# Patient Record
Sex: MALE | NOT HISPANIC OR LATINO | URBAN - METROPOLITAN AREA
[De-identification: names, ages, dates, MRNs, and addresses within clinical notes are randomized per-mention and may not be internally consistent; named-entity substitution may affect disease eponyms.]

---

## 2021-05-23 ENCOUNTER — EMERGENCY (EMERGENCY)
Facility: HOSPITAL | Age: 31
LOS: 1 days | Discharge: HOME | End: 2021-05-23
Attending: EMERGENCY MEDICINE | Admitting: EMERGENCY MEDICINE
Payer: MEDICAID

## 2021-05-23 VITALS
DIASTOLIC BLOOD PRESSURE: 72 MMHG | RESPIRATION RATE: 19 BRPM | HEART RATE: 87 BPM | OXYGEN SATURATION: 100 % | SYSTOLIC BLOOD PRESSURE: 124 MMHG

## 2021-05-23 VITALS
TEMPERATURE: 98 F | RESPIRATION RATE: 17 BRPM | DIASTOLIC BLOOD PRESSURE: 80 MMHG | HEART RATE: 147 BPM | OXYGEN SATURATION: 98 % | SYSTOLIC BLOOD PRESSURE: 147 MMHG

## 2021-05-23 LAB
ALBUMIN SERPL ELPH-MCNC: 4.4 G/DL — SIGNIFICANT CHANGE UP (ref 3.3–5)
ALP SERPL-CCNC: 77 U/L — SIGNIFICANT CHANGE UP (ref 40–120)
ALT FLD-CCNC: 30 U/L — SIGNIFICANT CHANGE UP (ref 4–41)
ANION GAP SERPL CALC-SCNC: 11 MMOL/L — SIGNIFICANT CHANGE UP (ref 7–14)
AST SERPL-CCNC: 20 U/L — SIGNIFICANT CHANGE UP (ref 4–40)
BASOPHILS # BLD AUTO: 0.05 K/UL — SIGNIFICANT CHANGE UP (ref 0–0.2)
BASOPHILS NFR BLD AUTO: 0.5 % — SIGNIFICANT CHANGE UP (ref 0–2)
BILIRUB SERPL-MCNC: 0.8 MG/DL — SIGNIFICANT CHANGE UP (ref 0.2–1.2)
BUN SERPL-MCNC: 9 MG/DL — SIGNIFICANT CHANGE UP (ref 7–23)
CALCIUM SERPL-MCNC: 9.5 MG/DL — SIGNIFICANT CHANGE UP (ref 8.4–10.5)
CHLORIDE SERPL-SCNC: 103 MMOL/L — SIGNIFICANT CHANGE UP (ref 98–107)
CO2 SERPL-SCNC: 24 MMOL/L — SIGNIFICANT CHANGE UP (ref 22–31)
CREAT SERPL-MCNC: 0.94 MG/DL — SIGNIFICANT CHANGE UP (ref 0.5–1.3)
CRP SERPL-MCNC: <4 MG/L — SIGNIFICANT CHANGE UP
D DIMER BLD IA.RAPID-MCNC: 253 NG/ML DDU — HIGH
EOSINOPHIL # BLD AUTO: 0.19 K/UL — SIGNIFICANT CHANGE UP (ref 0–0.5)
EOSINOPHIL NFR BLD AUTO: 2 % — SIGNIFICANT CHANGE UP (ref 0–6)
ERYTHROCYTE [SEDIMENTATION RATE] IN BLOOD: 2 MM/HR — SIGNIFICANT CHANGE UP (ref 1–15)
GLUCOSE SERPL-MCNC: 99 MG/DL — SIGNIFICANT CHANGE UP (ref 70–99)
HCT VFR BLD CALC: 48.3 % — SIGNIFICANT CHANGE UP (ref 39–50)
HGB BLD-MCNC: 16.3 G/DL — SIGNIFICANT CHANGE UP (ref 13–17)
IANC: 5.72 K/UL — SIGNIFICANT CHANGE UP (ref 1.5–8.5)
IMM GRANULOCYTES NFR BLD AUTO: 0.3 % — SIGNIFICANT CHANGE UP (ref 0–1.5)
LYMPHOCYTES # BLD AUTO: 2.73 K/UL — SIGNIFICANT CHANGE UP (ref 1–3.3)
LYMPHOCYTES # BLD AUTO: 28.8 % — SIGNIFICANT CHANGE UP (ref 13–44)
MAGNESIUM SERPL-MCNC: 2.4 MG/DL — SIGNIFICANT CHANGE UP (ref 1.6–2.6)
MCHC RBC-ENTMCNC: 29.4 PG — SIGNIFICANT CHANGE UP (ref 27–34)
MCHC RBC-ENTMCNC: 33.7 GM/DL — SIGNIFICANT CHANGE UP (ref 32–36)
MCV RBC AUTO: 87.2 FL — SIGNIFICANT CHANGE UP (ref 80–100)
MONOCYTES # BLD AUTO: 0.75 K/UL — SIGNIFICANT CHANGE UP (ref 0–0.9)
MONOCYTES NFR BLD AUTO: 7.9 % — SIGNIFICANT CHANGE UP (ref 2–14)
NEUTROPHILS # BLD AUTO: 5.72 K/UL — SIGNIFICANT CHANGE UP (ref 1.8–7.4)
NEUTROPHILS NFR BLD AUTO: 60.5 % — SIGNIFICANT CHANGE UP (ref 43–77)
NRBC # BLD: 0 /100 WBCS — SIGNIFICANT CHANGE UP
NRBC # FLD: 0 K/UL — SIGNIFICANT CHANGE UP
PLATELET # BLD AUTO: 375 K/UL — SIGNIFICANT CHANGE UP (ref 150–400)
POTASSIUM SERPL-MCNC: 4 MMOL/L — SIGNIFICANT CHANGE UP (ref 3.5–5.3)
POTASSIUM SERPL-SCNC: 4 MMOL/L — SIGNIFICANT CHANGE UP (ref 3.5–5.3)
PROT SERPL-MCNC: 7.9 G/DL — SIGNIFICANT CHANGE UP (ref 6–8.3)
RBC # BLD: 5.54 M/UL — SIGNIFICANT CHANGE UP (ref 4.2–5.8)
RBC # FLD: 12.1 % — SIGNIFICANT CHANGE UP (ref 10.3–14.5)
SODIUM SERPL-SCNC: 138 MMOL/L — SIGNIFICANT CHANGE UP (ref 135–145)
TROPONIN T, HIGH SENSITIVITY RESULT: <6 NG/L — SIGNIFICANT CHANGE UP
TSH SERPL-MCNC: 1.51 UIU/ML — SIGNIFICANT CHANGE UP (ref 0.27–4.2)
WBC # BLD: 9.47 K/UL — SIGNIFICANT CHANGE UP (ref 3.8–10.5)
WBC # FLD AUTO: 9.47 K/UL — SIGNIFICANT CHANGE UP (ref 3.8–10.5)

## 2021-05-23 PROCEDURE — 71045 X-RAY EXAM CHEST 1 VIEW: CPT | Mod: 26

## 2021-05-23 PROCEDURE — 93010 ELECTROCARDIOGRAM REPORT: CPT

## 2021-05-23 PROCEDURE — 71275 CT ANGIOGRAPHY CHEST: CPT | Mod: 26

## 2021-05-23 PROCEDURE — 99284 EMERGENCY DEPT VISIT MOD MDM: CPT | Mod: 25

## 2021-05-23 RX ORDER — SODIUM CHLORIDE 9 MG/ML
1000 INJECTION, SOLUTION INTRAVENOUS ONCE
Refills: 0 | Status: COMPLETED | OUTPATIENT
Start: 2021-05-23 | End: 2021-05-23

## 2021-05-23 RX ADMIN — SODIUM CHLORIDE 1000 MILLILITER(S): 9 INJECTION, SOLUTION INTRAVENOUS at 16:23

## 2021-05-23 NOTE — ED PROVIDER NOTE - PHYSICAL EXAMINATION
GENERAL: Patient awake alert NAD.  HEENT: NC/AT, Moist mucous membranes, PERRL, EOMI.  LUNGS: CTAB, no wheezes or crackles.  CARDIAC: sinus tachycardia, no m/r/g.  ABDOMEN: Soft, NT, ND, No rebound, guarding.  EXT: No edema. No calf tenderness. CV 2+DP/PT bilaterally.   MSK: No pain with movement, no deformities.  NEURO: A&Ox3. Moving all extremities. Motor strength 5/5 in all four extremities. Sensation intact. CN 2-12 grossly intact.  SKIN: Warm and dry. No rash.  PSYCH: Normal affect.

## 2021-05-23 NOTE — ED PROVIDER NOTE - NSFOLLOWUPINSTRUCTIONS_ED_ALL_ED_FT
You were seen for palpitations.    Your work-up in the ED did not reveal anything acutely concerning.    I have set you up with a cardiology follow-up.  You should get a call from the hospital within three days with a confirmed appointment.  If you do not, you can call the number above to make an appointment.    If you have any concerning symptoms, seek immediate medical attention.

## 2021-05-23 NOTE — ED ADULT NURSE NOTE - NSIMPLEMENTINTERV_GEN_ALL_ED
Implemented All Fall Risk Interventions:  [place] to call system. Call bell, personal items and telephone within reach. Instruct patient to call for assistance. Room bathroom lighting operational. Non-slip footwear when patient is off stretcher. Physically safe environment: no spills, clutter or unnecessary equipment. Stretcher in lowest position, wheels locked, appropriate side rails in place. Provide visual cue, wrist band, yellow gown, etc. Monitor gait and stability. Monitor for mental status changes and reorient to person, place, and time. Review medications for side effects contributing to fall risk. Reinforce activity limits and safety measures with patient and family.

## 2021-05-23 NOTE — ED ADULT NURSE NOTE - OBJECTIVE STATEMENT
29 y/o male arrives to E.D. rm 21 c/o fast heart rate and nausea x 10 days. Pt seen at another E.D. twice recently for same sxs. Pt is a&ox4, ambulatory, neg sob, pt speaking in complete sentences, denies chest pain, endorses palpitations, also c/o persistent nausea and poor PO intake, denies vomiting/diarrhea, c/o subjective fever last night, denies difficulty urinating. NSR/ST to 120s on tele currently. Will continue to monitor.

## 2021-05-23 NOTE — ED ADULT TRIAGE NOTE - CHIEF COMPLAINT QUOTE
Pt c/o intermittent palpitations and chest pain x10 days. Pt also endorses sob and dizziness. Pt breathing even and unlabored in triage. Pt tachycardic in triage.

## 2021-05-23 NOTE — ED PROVIDER NOTE - ATTENDING CONTRIBUTION TO CARE
I performed a face-to-face evaluation of the patient and performed a history and physical examination. I agree with the history and physical examination.    Yonas: Got COVID vaccine. P/w palpitations ([facility] subjective F, sweating. HR up to 110, no dysrhythmia. General weakness. Check TSH, Hb, trop, ESR/CRP (? pericarditis or endocarditis). Give IVF. Not likely PE or other VTE: PERC or Wells low score, EKG no e/o R-heart strain. I performed a face-to-face evaluation of the patient and performed a history and physical examination. I agree with the history and physical examination.    Yonas: Got COVID vaccine 9 days ago. Since then, palpitations (seen at [place] ED x 2 for this), subjective F, sweating. HR up to 110, no dysrhythmia. General weakness. Check TSH, Hb, trop, ESR/CRP (? pericarditis or endocarditis). Give IVF. Walking , SaO2 90%. Check d-dimer for PE.

## 2021-05-23 NOTE — ED PROVIDER NOTE - CLINICAL SUMMARY MEDICAL DECISION MAKING FREE TEXT BOX
Yonas: Got COVID vaccine. P/w palpitations (seen at [place] ED x 2 for this), subjective F, sweating. HR up to 110, no dysrhythmia. General weakness. Check TSH, Hb, trop, ESR/CRP (? pericarditis or endocarditis). Give IVF. Not likely PE or other VTE: PERC or Wells low score, EKG no e/o R-heart strain. Yonas: Got COVID vaccine 9 days ago. Since then, palpitations (seen at [place] ED x 2 for this), subjective F, sweating. HR up to 110, no dysrhythmia. General weakness. Check TSH, Hb, trop, ESR/CRP (? pericarditis or endocarditis). Give IVF. Walking , SaO2 90%. Check d-dimer for PE. Yonas: Got COVID vaccine 9 days ago. Since then, palpitations (seen at [place] ED x 2 for this), subjective F, sweating. HR up to 110, no dysrhythmia. General weakness. Check TSH, Hb, trop, ESR/CRP (? pericarditis or endocarditis). Give IVF. Walking , SaO2 90%. Check d-dimer and CTA for PE.

## 2021-05-23 NOTE — ED PROVIDER NOTE - PROGRESS NOTE DETAILS
Abdelrahman PGY2 - HR improved to the 80s.  W/u unremarkable.  Will dc w/ urgent cards f/u.  All other questions and concerns have been addressed.  Pt. agreeable to plan.

## 2021-05-23 NOTE — ED PROVIDER NOTE - OBJECTIVE STATEMENT
30M w/ no PMHx p/w palpitations since 5/16.  Pt. got his first dose of moderna on 5/14.  Endorses chest discomfort, SOB, weakness.  Subjective fevers last night.  Denies any cough, urinary sxs, rectal bleeding.  Went to two EDs over the last week and was dc/d after a negative w/u.  Never told to f/u w/ a cardiologist.  Endorses good PO intake.  Former smoker (3-4 cigarettes per month), current occasional marijuana smoking.  Denies recent long-distance travel or LE swelling.  Denies hx of MI, CVA, cardiac stents.  Denies FHx of CAD.

## 2021-05-26 PROBLEM — Z78.9 OTHER SPECIFIED HEALTH STATUS: Chronic | Status: ACTIVE | Noted: 2021-05-23

## 2021-05-26 PROBLEM — Z00.00 ENCOUNTER FOR PREVENTIVE HEALTH EXAMINATION: Status: ACTIVE | Noted: 2021-05-26

## 2021-05-27 ENCOUNTER — APPOINTMENT (OUTPATIENT)
Dept: CARDIOLOGY | Facility: CLINIC | Age: 31
End: 2021-05-27

## 2021-06-02 ENCOUNTER — APPOINTMENT (OUTPATIENT)
Dept: CARDIOLOGY | Facility: CLINIC | Age: 31
End: 2021-06-02
Payer: MEDICAID

## 2021-06-02 ENCOUNTER — NON-APPOINTMENT (OUTPATIENT)
Age: 31
End: 2021-06-02

## 2021-06-02 VITALS
HEART RATE: 99 BPM | BODY MASS INDEX: 25.16 KG/M2 | HEIGHT: 68 IN | SYSTOLIC BLOOD PRESSURE: 116 MMHG | DIASTOLIC BLOOD PRESSURE: 72 MMHG | TEMPERATURE: 97.9 F | WEIGHT: 166 LBS | OXYGEN SATURATION: 97 %

## 2021-06-02 DIAGNOSIS — Z87.898 PERSONAL HISTORY OF OTHER SPECIFIED CONDITIONS: ICD-10-CM

## 2021-06-02 DIAGNOSIS — Z87.891 PERSONAL HISTORY OF NICOTINE DEPENDENCE: ICD-10-CM

## 2021-06-02 PROCEDURE — 93306 TTE W/DOPPLER COMPLETE: CPT

## 2021-06-02 PROCEDURE — 99203 OFFICE O/P NEW LOW 30 MIN: CPT

## 2021-06-02 PROCEDURE — 93225 XTRNL ECG REC<48 HRS REC: CPT

## 2021-06-02 NOTE — PHYSICAL EXAM
[No Carotid Bruit] : no carotid bruit [Soft] : abdomen soft [Non Tender] : non-tender [Normal Bowel Sounds] : normal bowel sounds [Normal] : alert and oriented, normal memory

## 2021-06-02 NOTE — DISCUSSION/SUMMARY
[FreeTextEntry1] : In a summary Duarte Garibay is a young male with Palpitations after receiving Moderna COVID 19 Vaccine , Echo done showed normal LV systolic function and wall motion. 24 Hour Holter monitor to rule out arrhythmias. Continue Metoprolol. Further plan based on Holter results.

## 2021-06-02 NOTE — REVIEW OF SYSTEMS
[Palpitations] : palpitations [Joint Pain] : joint pain [Dizziness] : dizziness [Negative] : Psychiatric [Blurry Vision] : no blurred vision [Eye Pain] : no eye pain [Earache] : no earache [Sore Throat] : no sore throat [Dyspnea on exertion] : not dyspnea during exertion [Chest Discomfort] : no chest discomfort [Lower Ext Edema] : no extremity edema [Orthopnea] : no orthopnea [PND] : no PND [Cough] : no cough [Wheezing] : no wheezing [Snoring] : no snoring [Urinary Frequency] : no change in urinary frequency [Tremor] : no tremor was seen [Numbness (Hypoesthesia)] : no numbness [Easy Bleeding] : no tendency for easy bleeding [Easy Bruising] : no tendency for easy bruising

## 2021-06-02 NOTE — HISTORY OF PRESENT ILLNESS
[FreeTextEntry1] : Clifton Garibayjerry is a 30 year old male with no significant medical history comes for evaluation of Palpitations.  Had COVID 19 Virus Vaccine on May 14 th ( Moderna) . 2 days later started having dizziness, vomiting and palpitations on and off for which he went to [facility] ER twice and [facility] ER once. Had CT angio negative for Pulmonary embolism. Denies any fever, chest pain or shortness of breath. EKG on 5/25 showed normal sinus rhythm. PCP started Metoprolol 25 mg twice daily with some improvement in palpitations. Still on and off palpitations more after eating. Physically active.

## 2021-06-09 ENCOUNTER — NON-APPOINTMENT (OUTPATIENT)
Age: 31
End: 2021-06-09

## 2021-06-09 ENCOUNTER — APPOINTMENT (OUTPATIENT)
Dept: CARDIOLOGY | Facility: CLINIC | Age: 31
End: 2021-06-09
Payer: MEDICAID

## 2021-06-09 PROCEDURE — 93227 XTRNL ECG REC<48 HR R&I: CPT

## 2021-08-11 ENCOUNTER — APPOINTMENT (OUTPATIENT)
Dept: CARDIOLOGY | Facility: CLINIC | Age: 31
End: 2021-08-11
Payer: MEDICAID

## 2021-08-11 ENCOUNTER — NON-APPOINTMENT (OUTPATIENT)
Age: 31
End: 2021-08-11

## 2021-08-11 VITALS
SYSTOLIC BLOOD PRESSURE: 96 MMHG | OXYGEN SATURATION: 97 % | HEIGHT: 68 IN | TEMPERATURE: 97.5 F | BODY MASS INDEX: 24.71 KG/M2 | WEIGHT: 163 LBS | DIASTOLIC BLOOD PRESSURE: 60 MMHG | HEART RATE: 69 BPM

## 2021-08-11 DIAGNOSIS — R00.2 PALPITATIONS: ICD-10-CM

## 2021-08-11 PROCEDURE — 93000 ELECTROCARDIOGRAM COMPLETE: CPT

## 2021-08-11 PROCEDURE — 99213 OFFICE O/P EST LOW 20 MIN: CPT

## 2021-08-11 RX ORDER — METOPROLOL TARTRATE 25 MG/1
25 TABLET, FILM COATED ORAL TWICE DAILY
Refills: 0 | Status: ACTIVE | COMMUNITY

## 2021-08-11 NOTE — DISCUSSION/SUMMARY
[FreeTextEntry1] : In a summary Glenna Garibaymilljerry is a young male with palpitations, Echo and Holter Monitor results with in normal Limits. Palpitations after eating food and early morning, rule out GERD. Recommend GI evaluation. Continue Metoprolol for now. Continue exercises. Follow up as needed.

## 2021-08-11 NOTE — HISTORY OF PRESENT ILLNESS
[FreeTextEntry1] : Duarte Garibay is a 31 year old male with palpitations comes for follow up visit. Says he is taking half dose of Metoprolol as he felt dizzy and weak when he took Metoprolol 25 mg twice daily. Since decreased the dose he feels better. Still getting palpitations in the early morning and when he eats heavy meals. No chest pain or shortness of breath. History of GERD in the past. Says palpitations started after taking first dose of Moderna COVID 19 vaccine. Did not take 2 nd dose. Physically active.

## 2021-08-11 NOTE — REVIEW OF SYSTEMS
[Palpitations] : palpitations [Negative] : Psychiatric [Blurry Vision] : no blurred vision [Eye Pain] : no eye pain [Earache] : no earache [Sore Throat] : no sore throat [Dyspnea on exertion] : not dyspnea during exertion [Chest Discomfort] : no chest discomfort [Lower Ext Edema] : no extremity edema [Orthopnea] : no orthopnea [PND] : no PND [Cough] : no cough [Wheezing] : no wheezing [Snoring] : no snoring [Urinary Frequency] : no change in urinary frequency [Joint Pain] : no joint pain [Dizziness] : no dizziness [Tremor] : no tremor was seen [Numbness (Hypoesthesia)] : no numbness [Easy Bleeding] : no tendency for easy bleeding [Easy Bruising] : no tendency for easy bruising

## 2024-02-19 NOTE — ED PROVIDER NOTE - PATIENT PORTAL LINK FT
Vaccine status unknown
You can access the FollowMyHealth Patient Portal offered by [facility] by registering at the following website: [email or web address]. By joining [employer]’s FollowMyHealth portal, you will also be able to view your health information using other applications (apps) compatible with our system.